# Patient Record
Sex: MALE | Race: WHITE
[De-identification: names, ages, dates, MRNs, and addresses within clinical notes are randomized per-mention and may not be internally consistent; named-entity substitution may affect disease eponyms.]

---

## 2017-03-10 ENCOUNTER — HOSPITAL ENCOUNTER (EMERGENCY)
Dept: HOSPITAL 53 - M ED | Age: 47
Discharge: HOME | End: 2017-03-10
Payer: SELF-PAY

## 2017-03-10 VITALS — WEIGHT: 166 LBS | BODY MASS INDEX: 25.16 KG/M2 | HEIGHT: 68 IN

## 2017-03-10 VITALS — DIASTOLIC BLOOD PRESSURE: 73 MMHG | SYSTOLIC BLOOD PRESSURE: 137 MMHG

## 2017-03-10 DIAGNOSIS — I10: ICD-10-CM

## 2017-03-10 DIAGNOSIS — K57.32: Primary | ICD-10-CM

## 2017-03-10 DIAGNOSIS — Z88.8: ICD-10-CM

## 2017-03-10 DIAGNOSIS — Z79.899: ICD-10-CM

## 2017-03-10 DIAGNOSIS — R11.2: ICD-10-CM

## 2017-03-10 DIAGNOSIS — E78.9: ICD-10-CM

## 2017-03-10 LAB
ALBUMIN SERPL BCG-MCNC: 4.1 GM/DL (ref 3.2–5.2)
ALBUMIN/GLOB SERPL: 1.37 {RATIO} (ref 1–1.93)
ALP SERPL-CCNC: 53 U/L (ref 45–117)
ALT SERPL W P-5'-P-CCNC: 35 U/L (ref 12–78)
ANION GAP SERPL CALC-SCNC: 9 MEQ/L (ref 8–16)
AST SERPL-CCNC: 16 U/L (ref 15–37)
BASOPHILS # BLD AUTO: 0 K/MM3 (ref 0–0.2)
BASOPHILS NFR BLD AUTO: 0.4 % (ref 0–1)
BILIRUB CONJ SERPL-MCNC: 0.1 MG/DL (ref 0–0.2)
BILIRUB SERPL-MCNC: 0.4 MG/DL (ref 0.2–1)
BUN SERPL-MCNC: 23 MG/DL (ref 7–18)
CALCIUM SERPL-MCNC: 9 MG/DL (ref 8.5–10.1)
CHLORIDE SERPL-SCNC: 109 MEQ/L (ref 98–107)
CO2 SERPL-SCNC: 28 MEQ/L (ref 21–32)
CREAT SERPL-MCNC: 1.11 MG/DL (ref 0.7–1.3)
EOSINOPHIL # BLD AUTO: 0.1 K/MM3 (ref 0–0.5)
EOSINOPHIL NFR BLD AUTO: 1.1 % (ref 0–3)
ERYTHROCYTE [DISTWIDTH] IN BLOOD BY AUTOMATED COUNT: 12.6 % (ref 11.5–14.5)
GFR SERPL CREATININE-BSD FRML MDRD: > 60 ML/MIN/{1.73_M2} (ref 60–?)
GLUCOSE SERPL-MCNC: 131 MG/DL (ref 70–105)
LARGE UNSTAINED CELL #: 0.1 K/MM3 (ref 0–0.4)
LARGE UNSTAINED CELL %: 0.5 % (ref 0–4)
LYMPHOCYTES # BLD AUTO: 0.7 K/MM3 (ref 1.5–4.5)
LYMPHOCYTES NFR BLD AUTO: 6.3 % (ref 24–44)
MCH RBC QN AUTO: 27.2 PG (ref 27–33)
MCHC RBC AUTO-ENTMCNC: 33.7 G/DL (ref 32–36.5)
MCV RBC AUTO: 80.6 FL (ref 80–96)
MONOCYTES # BLD AUTO: 0.2 K/MM3 (ref 0–0.8)
MONOCYTES NFR BLD AUTO: 1.9 % (ref 0–5)
NEUTROPHILS # BLD AUTO: 10.2 K/MM3 (ref 1.8–7.7)
NEUTROPHILS NFR BLD AUTO: 89.8 % (ref 36–66)
PLATELET # BLD AUTO: 207 K/MM3 (ref 150–450)
POTASSIUM SERPL-SCNC: 3.9 MEQ/L (ref 3.5–5.1)
PROT SERPL-MCNC: 7.1 GM/DL (ref 6.4–8.2)
SODIUM SERPL-SCNC: 146 MEQ/L (ref 136–145)
WBC # BLD AUTO: 11.4 K/MM3 (ref 4–10)

## 2017-06-03 ENCOUNTER — HOSPITAL ENCOUNTER (EMERGENCY)
Dept: HOSPITAL 53 - M ED | Age: 47
LOS: 1 days | Discharge: HOME | End: 2017-06-04
Payer: OTHER GOVERNMENT

## 2017-06-03 VITALS — BODY MASS INDEX: 25.31 KG/M2 | WEIGHT: 167 LBS | HEIGHT: 68 IN

## 2017-06-03 DIAGNOSIS — I10: ICD-10-CM

## 2017-06-03 DIAGNOSIS — Z79.899: ICD-10-CM

## 2017-06-03 DIAGNOSIS — K57.30: Primary | ICD-10-CM

## 2017-06-03 DIAGNOSIS — Z88.8: ICD-10-CM

## 2017-06-03 DIAGNOSIS — E78.5: ICD-10-CM

## 2017-06-03 LAB
ALBUMIN SERPL BCG-MCNC: 3.9 GM/DL (ref 3.2–5.2)
ALBUMIN/GLOB SERPL: 1.22 {RATIO} (ref 1–1.93)
ALP SERPL-CCNC: 58 U/L (ref 45–117)
ALT SERPL W P-5'-P-CCNC: 34 U/L (ref 12–78)
AMYLASE SERPL-CCNC: 63 U/L (ref 25–115)
ANION GAP SERPL CALC-SCNC: 6 MEQ/L (ref 8–16)
ANISOCYTOSIS BLD QL SMEAR: (no result)
AST SERPL-CCNC: 15 U/L (ref 15–37)
BASOPHILS NFR BLD MANUAL: 1 % (ref 0–4)
BILIRUB CONJ SERPL-MCNC: 0.1 MG/DL (ref 0–0.2)
BILIRUB SERPL-MCNC: 0.3 MG/DL (ref 0.2–1)
BUN SERPL-MCNC: 16 MG/DL (ref 7–18)
CALCIUM SERPL-MCNC: 9.4 MG/DL (ref 8.5–10.1)
CHLORIDE SERPL-SCNC: 104 MEQ/L (ref 98–107)
CO2 SERPL-SCNC: 31 MEQ/L (ref 21–32)
CREAT SERPL-MCNC: 0.98 MG/DL (ref 0.7–1.3)
EOSINOPHIL NFR BLD MANUAL: 2 % (ref 0–5)
ERYTHROCYTE [DISTWIDTH] IN BLOOD BY AUTOMATED COUNT: 13.6 % (ref 11.5–14.5)
GFR SERPL CREATININE-BSD FRML MDRD: > 60 ML/MIN/{1.73_M2} (ref 60–?)
GLUCOSE SERPL-MCNC: 98 MG/DL (ref 70–105)
MCH RBC QN AUTO: 27.6 PG (ref 27–33)
MCHC RBC AUTO-ENTMCNC: 33.6 G/DL (ref 32–36.5)
MCV RBC AUTO: 82.1 FL (ref 80–96)
NEUTS BAND NFR BLD: 5 % (ref ?–11)
PLATELET # BLD AUTO: 210 K/MM3 (ref 150–450)
POTASSIUM SERPL-SCNC: 3.7 MEQ/L (ref 3.5–5.1)
PROT SERPL-MCNC: 7.1 GM/DL (ref 6.4–8.2)
SODIUM SERPL-SCNC: 141 MEQ/L (ref 136–145)
WBC # BLD AUTO: 12.3 K/MM3 (ref 4–10)

## 2017-06-03 PROCEDURE — 87086 URINE CULTURE/COLONY COUNT: CPT

## 2017-06-03 PROCEDURE — 96365 THER/PROPH/DIAG IV INF INIT: CPT

## 2017-06-03 PROCEDURE — 80076 HEPATIC FUNCTION PANEL: CPT

## 2017-06-03 PROCEDURE — 96376 TX/PRO/DX INJ SAME DRUG ADON: CPT

## 2017-06-03 PROCEDURE — 80048 BASIC METABOLIC PNL TOTAL CA: CPT

## 2017-06-03 PROCEDURE — 82150 ASSAY OF AMYLASE: CPT

## 2017-06-03 PROCEDURE — 96361 HYDRATE IV INFUSION ADD-ON: CPT

## 2017-06-03 PROCEDURE — 74176 CT ABD & PELVIS W/O CONTRAST: CPT

## 2017-06-03 PROCEDURE — 85025 COMPLETE CBC W/AUTO DIFF WBC: CPT

## 2017-06-03 PROCEDURE — 96374 THER/PROPH/DIAG INJ IV PUSH: CPT

## 2017-06-03 PROCEDURE — 99283 EMERGENCY DEPT VISIT LOW MDM: CPT

## 2017-06-03 PROCEDURE — 83690 ASSAY OF LIPASE: CPT

## 2017-06-03 PROCEDURE — 96366 THER/PROPH/DIAG IV INF ADDON: CPT

## 2017-06-03 PROCEDURE — 81001 URINALYSIS AUTO W/SCOPE: CPT

## 2017-06-03 NOTE — REPUSA
CT of the abdomen and pelvis without contrast

Clinical statement: Pain, left lower quadrant.

Technique: Multiple axial CT images were obtained from the base of the lungs to the floor of the pelv
is utilizing 5 mm axial slices  after administration of oral contrast. Coronal and sagittal reconstru
ctions were also obtained.

Comparison:  12/15/2013.

Findings:

Chest: The visualized lung bases demonstrate minimal atelectasis bilaterally.

Abdomen: The kidneys are normal in size bilaterally. There is no evidence of hydronephrosis or nephro
lithiasis. The liver, spleen, pancreas, gallbladder and adrenal glands are unremarkable. The aorta de
monstrates normal caliber and contour. There is no abdominal lymphadenopathy or ascites.

Pelvis: Moderate amount of stool fills the colon. There is focal bowel wall thickening and inflammati
on in the sigmoid colon, consistent with acute sigmoid diverticulitis. Significant mesenteric inflamm
atory changes are noted. There is no evidence of abscess or perforation. There is no bowel obstructio
n. The appendix is normal. The urinary bladder is within normal limits. There is no pelvic lymphadeno
kaity or ascites. The other pelvic structures appear unremarkable.

Bones: There are no suspicious osseous abnormalities seen. There is mild degenerative disc disease at
 L5/S1.

Impression:

1. Moderately severe acute sigmoid diverticulitis. No evidence of abscess or perforation.

2. Moderate constipation. No evidence of bowel obstruction.

3. No evidence of hydronephrosis or nephrolithiasis.

4. Minimal atelectasis in the lung bases bilaterally.

5. Mild degenerative disc disease at L5/S1.

     Electronically signed by PARTH SHERIDAN MD on 06/03/2017 11:13:16 PM ET

## 2017-06-04 VITALS — SYSTOLIC BLOOD PRESSURE: 109 MMHG | DIASTOLIC BLOOD PRESSURE: 55 MMHG

## 2018-04-29 ENCOUNTER — HOSPITAL ENCOUNTER (EMERGENCY)
Dept: HOSPITAL 53 - M ED | Age: 48
LOS: 1 days | Discharge: HOME | End: 2018-04-30
Payer: OTHER GOVERNMENT

## 2018-04-29 DIAGNOSIS — K57.92: Primary | ICD-10-CM

## 2018-04-29 DIAGNOSIS — Z79.899: ICD-10-CM

## 2018-04-29 DIAGNOSIS — Z88.8: ICD-10-CM

## 2018-04-29 LAB
ALBUMIN/GLOBULIN RATIO: 1.09 (ref 1–1.93)
ALBUMIN: 3.8 GM/DL (ref 3.2–5.2)
ALKALINE PHOSPHATASE: 62 U/L (ref 45–117)
ALT SERPL W P-5'-P-CCNC: 42 U/L (ref 12–78)
AMYLASE SERPL-CCNC: 57 U/L (ref 25–115)
ANION GAP: 7 MEQ/L (ref 8–16)
AST SERPL-CCNC: 20 U/L (ref 7–37)
BASO #: 0.1 10^3/UL (ref 0–0.2)
BASO %: 0.7 % (ref 0–1)
BILIRUB CONJ SERPL-MCNC: 0.1 MG/DL (ref 0–0.2)
BILIRUBIN,TOTAL: 0.5 MG/DL (ref 0.2–1)
BLOOD UREA NITROGEN: 15 MG/DL (ref 7–18)
CALCIUM LEVEL: 8.7 MG/DL (ref 8.5–10.1)
CARBON DIOXIDE LEVEL: 27 MEQ/L (ref 21–32)
CHLORIDE LEVEL: 106 MEQ/L (ref 98–107)
CREATININE FOR GFR: 1.04 MG/DL (ref 0.7–1.3)
EOS #: 0.2 10^3/UL (ref 0–0.5)
EOSINOPHIL NFR BLD AUTO: 2.4 % (ref 0–3)
GFR SERPL CREATININE-BSD FRML MDRD: > 60 ML/MIN/{1.73_M2} (ref 60–?)
GLUCOSE, FASTING: 95 MG/DL (ref 70–100)
HEMATOCRIT: 40.4 % (ref 42–52)
HEMOGLOBIN: 13.5 G/DL (ref 13.5–17.5)
IMMATURE GRANULOCYTE %: 0.3 % (ref 0–3)
LEUKOCYTE ESTERASE UR AUTO RFX: NEGATIVE
LIPASE: 49 U/L (ref 73–393)
LYMPH #: 1.6 10^3/UL (ref 1.5–4.5)
LYMPH %: 17.4 % (ref 24–44)
MEAN CORPUSCULAR HEMOGLOBIN: 27.2 PG (ref 27–33)
MEAN CORPUSCULAR HGB CONC: 33.4 G/DL (ref 32–36.5)
MEAN CORPUSCULAR VOLUME: 81.5 FL (ref 80–96)
MONO #: 0.7 10^3/UL (ref 0–0.8)
MONO %: 7.7 % (ref 0–5)
NEUTROPHILS #: 6.4 10^3/UL (ref 1.8–7.7)
NEUTROPHILS %: 71.5 % (ref 36–66)
NRBC BLD AUTO-RTO: 0 % (ref 0–0)
PLATELET COUNT, AUTOMATED: 231 10^3/UL (ref 150–450)
POTASSIUM SERUM: 4 MEQ/L (ref 3.5–5.1)
RED BLOOD COUNT: 4.96 10^6/UL (ref 4.3–6.1)
RED CELL DISTRIBUTION WIDTH: 12.7 % (ref 11.5–14.5)
SODIUM LEVEL: 140 MEQ/L (ref 136–145)
SPECIFIC GRAVITY UR AUTO RFX: 1.01 (ref 1–1.03)
SQUAM EPITHELIAL CELL UR AURFX: 0 /HPF (ref 0–6)
TOTAL PROTEIN: 7.3 GM/DL (ref 6.4–8.2)
WHITE BLOOD COUNT: 9 10^3/UL (ref 4–10)

## 2018-04-29 RX ADMIN — DIATRIZOATE MEGLUMINE AND DIATRIZOATE SODIUM 1 ML: 600; 100 SOLUTION ORAL; RECTAL at 22:00

## 2018-04-29 RX ADMIN — SODIUM CHLORIDE 1 MLS/HR: 9 INJECTION, SOLUTION INTRAVENOUS at 21:45

## 2018-04-29 RX ADMIN — DIATRIZOATE MEGLUMINE AND DIATRIZOATE SODIUM 1 ML: 600; 100 SOLUTION ORAL; RECTAL at 22:30

## 2018-04-30 RX ADMIN — METRONIDAZOLE 1 MG: 500 TABLET ORAL at 00:08

## 2018-04-30 RX ADMIN — CIPROFLOXACIN HYDROCHLORIDE 1 MG: 500 TABLET, FILM COATED ORAL at 00:08

## 2020-10-14 ENCOUNTER — HOSPITAL ENCOUNTER (OUTPATIENT)
Dept: HOSPITAL 53 - M LABSMTC | Age: 50
End: 2020-10-14
Attending: ANESTHESIOLOGY
Payer: OTHER GOVERNMENT

## 2020-10-14 DIAGNOSIS — Z01.812: Primary | ICD-10-CM

## 2020-10-14 DIAGNOSIS — Z20.828: ICD-10-CM

## 2020-10-19 ENCOUNTER — HOSPITAL ENCOUNTER (OUTPATIENT)
Dept: HOSPITAL 53 - M OPP | Age: 50
Discharge: HOME | End: 2020-10-19
Attending: INTERNAL MEDICINE
Payer: OTHER GOVERNMENT

## 2020-10-19 VITALS — HEIGHT: 68 IN | WEIGHT: 172.4 LBS | BODY MASS INDEX: 26.13 KG/M2

## 2020-10-19 VITALS — DIASTOLIC BLOOD PRESSURE: 75 MMHG | SYSTOLIC BLOOD PRESSURE: 116 MMHG

## 2020-10-19 DIAGNOSIS — K57.30: ICD-10-CM

## 2020-10-19 DIAGNOSIS — Z12.11: Primary | ICD-10-CM

## 2020-10-19 DIAGNOSIS — K64.0: ICD-10-CM

## 2020-10-19 NOTE — ROOR
________________________________________________________________________________

Patient Name: Shailesh Hennessy            Procedure Date: 10/19/2020 10:08 AM

MRN: [id number]                          Account Number: [id number]

YOB: 1970               Age: 50

Room: [facility]                            Gender: Male

Note Status: Finalized                 

________________________________________________________________________________

 

Procedure:           Total Colonoscopy to Cecum

Indications:         Screening for colorectal malignant neoplasm

Providers:           Bobby Galarza MD

Referring MD:        Colby Miller

Requesting Provider: 

Medicines:           Monitored Anesthesia Care

Complications:       No immediate complications.

________________________________________________________________________________

Procedure:           Pre-Anesthesia Assessment:

                     - The heart rate, respiratory rate, oxygen saturations, 

                     blood pressure, adequacy of pulmonary ventilation, and 

                     response to care were monitored throughout the procedure.

                     The Colonoscope was introduced through the anus and 

                     advanced to the cecum, identified by appendiceal orifice 

                     and ileocecal valve. The colonoscopy was performed 

                     without difficulty. The patient tolerated the procedure 

                     well. The quality of the bowel preparation was excellent.

                                                                                

Findings:

     The perianal and digital rectal examinations were normal.

     Non-bleeding internal hemorrhoids were found during retroflexion. The 

     hemorrhoids were small and Grade I (internal hemorrhoids that do not 

     prolapse).

     Multiple small and large-mouthed diverticula were found in the 

     recto-sigmoid colon, sigmoid colon and descending colon.

     The exam was otherwise without abnormality on direct and retroflexion 

     views.

                                                                                

Impression:          - Non-bleeding internal hemorrhoids.

                     - Diverticulosis in the recto-sigmoid colon, in the 

                     sigmoid colon and in the descending colon.

                     - The examination was otherwise normal on direct and 

                     retroflexion views.

                     - No specimens collected.

                     - The exam was otherwise normal to the cecum.

Recommendation:      - Patient has a contact number available for emergencies. 

                     The signs and symptoms of potential delayed complications 

                     were discussed with the patient. Return to normal 

                     activities tomorrow. Written discharge instructions were 

                     provided to the patient.

                     - Discharge patient to home.

                     - Continue present medications.

                     - Repeat colonoscopy in 10 years for screening purposes.

                     - Return to referring physician.

                     - The findings and recommendations were discussed with 

                     the patient.

                                                                                

 

Bobby Galarza MD

____________________

Bobby Galarza MD

10/19/2020 10:28:26 AM

Electronically signed by Bobby Galarza MD

Number of Addenda: 0

 

Note Initiated On: 10/19/2020 10:08 AM

Estimated Blood Loss:

     Estimated blood loss: none.

## 2022-09-07 ENCOUNTER — HOSPITAL ENCOUNTER (EMERGENCY)
Dept: HOSPITAL 53 - M ED | Age: 52
Discharge: HOME | End: 2022-09-07
Payer: OTHER GOVERNMENT

## 2022-09-07 VITALS — DIASTOLIC BLOOD PRESSURE: 79 MMHG | SYSTOLIC BLOOD PRESSURE: 124 MMHG

## 2022-09-07 VITALS — BODY MASS INDEX: 26.58 KG/M2 | HEIGHT: 68 IN | WEIGHT: 175.38 LBS

## 2022-09-07 DIAGNOSIS — E86.0: ICD-10-CM

## 2022-09-07 DIAGNOSIS — I10: ICD-10-CM

## 2022-09-07 DIAGNOSIS — Z79.899: ICD-10-CM

## 2022-09-07 DIAGNOSIS — E78.5: ICD-10-CM

## 2022-09-07 DIAGNOSIS — Z88.8: ICD-10-CM

## 2022-09-07 DIAGNOSIS — K57.32: Primary | ICD-10-CM

## 2022-09-07 LAB
ALBUMIN SERPL BCG-MCNC: 3.8 GM/DL (ref 3.2–5.2)
ALT SERPL W P-5'-P-CCNC: 25 U/L (ref 12–78)
BASOPHILS # BLD AUTO: 0 10^3/UL (ref 0–0.2)
BASOPHILS NFR BLD AUTO: 0.3 % (ref 0–1)
BILIRUB CONJ SERPL-MCNC: 0.1 MG/DL (ref 0–0.2)
BILIRUB SERPL-MCNC: 0.5 MG/DL (ref 0.2–1)
BUN SERPL-MCNC: 19 MG/DL (ref 7–18)
CALCIUM SERPL-MCNC: 9.5 MG/DL (ref 8.5–10.1)
CHLORIDE SERPL-SCNC: 105 MEQ/L (ref 98–107)
CO2 SERPL-SCNC: 26 MEQ/L (ref 21–32)
CREAT SERPL-MCNC: 1.04 MG/DL (ref 0.7–1.3)
EOSINOPHIL # BLD AUTO: 0.3 10^3/UL (ref 0–0.5)
EOSINOPHIL NFR BLD AUTO: 3.5 % (ref 0–3)
GFR SERPL CREATININE-BSD FRML MDRD: > 60 ML/MIN/{1.73_M2} (ref 56–?)
GLUCOSE SERPL-MCNC: 128 MG/DL (ref 70–100)
HCT VFR BLD AUTO: 40.1 % (ref 42–52)
HGB BLD-MCNC: 13.2 G/DL (ref 13.5–17.5)
LIPASE SERPL-CCNC: 45 U/L (ref 73–393)
LYMPHOCYTES # BLD AUTO: 1.3 10^3/UL (ref 1.5–5)
LYMPHOCYTES NFR BLD AUTO: 14.7 % (ref 24–44)
MCH RBC QN AUTO: 26.9 PG (ref 27–33)
MCHC RBC AUTO-ENTMCNC: 32.9 G/DL (ref 32–36.5)
MCV RBC AUTO: 81.7 FL (ref 80–96)
MONOCYTES # BLD AUTO: 0.6 10^3/UL (ref 0–0.8)
MONOCYTES NFR BLD AUTO: 6.7 % (ref 2–8)
NEUTROPHILS # BLD AUTO: 6.4 10^3/UL (ref 1.5–8.5)
NEUTROPHILS NFR BLD AUTO: 74.6 % (ref 36–66)
PLATELET # BLD AUTO: 203 10^3/UL (ref 150–450)
POTASSIUM SERPL-SCNC: 3.6 MEQ/L (ref 3.5–5.1)
PROT SERPL-MCNC: 6.9 GM/DL (ref 6.4–8.2)
RBC # BLD AUTO: 4.91 10^6/UL (ref 4.3–6.1)
SODIUM SERPL-SCNC: 137 MEQ/L (ref 136–145)
WBC # BLD AUTO: 8.6 10^3/UL (ref 4–10)

## 2022-09-07 PROCEDURE — 99284 EMERGENCY DEPT VISIT MOD MDM: CPT

## 2022-09-07 PROCEDURE — 74177 CT ABD & PELVIS W/CONTRAST: CPT

## 2022-09-07 PROCEDURE — 80048 BASIC METABOLIC PNL TOTAL CA: CPT

## 2022-09-07 PROCEDURE — 87040 BLOOD CULTURE FOR BACTERIA: CPT

## 2022-09-07 PROCEDURE — 96365 THER/PROPH/DIAG IV INF INIT: CPT

## 2022-09-07 PROCEDURE — 83605 ASSAY OF LACTIC ACID: CPT

## 2022-09-07 PROCEDURE — 96375 TX/PRO/DX INJ NEW DRUG ADDON: CPT

## 2022-09-07 PROCEDURE — 80076 HEPATIC FUNCTION PANEL: CPT

## 2022-09-07 PROCEDURE — 85025 COMPLETE CBC W/AUTO DIFF WBC: CPT

## 2022-09-07 PROCEDURE — 83690 ASSAY OF LIPASE: CPT

## 2023-06-25 ENCOUNTER — HOSPITAL ENCOUNTER (EMERGENCY)
Dept: HOSPITAL 53 - M ED | Age: 53
Discharge: HOME | End: 2023-06-25
Payer: OTHER GOVERNMENT

## 2023-06-25 VITALS — OXYGEN SATURATION: 97 % | TEMPERATURE: 98.3 F | DIASTOLIC BLOOD PRESSURE: 94 MMHG | SYSTOLIC BLOOD PRESSURE: 152 MMHG

## 2023-06-25 VITALS — BODY MASS INDEX: 28.13 KG/M2 | HEIGHT: 68 IN | WEIGHT: 185.63 LBS

## 2023-06-25 DIAGNOSIS — B02.9: Primary | ICD-10-CM

## 2023-06-25 DIAGNOSIS — I10: ICD-10-CM

## 2023-06-25 DIAGNOSIS — Z88.8: ICD-10-CM
